# Patient Record
Sex: FEMALE | Race: WHITE | NOT HISPANIC OR LATINO | URBAN - METROPOLITAN AREA
[De-identification: names, ages, dates, MRNs, and addresses within clinical notes are randomized per-mention and may not be internally consistent; named-entity substitution may affect disease eponyms.]

---

## 2018-04-29 ENCOUNTER — EMERGENCY (EMERGENCY)
Facility: HOSPITAL | Age: 48
LOS: 1 days | Discharge: HOME | End: 2018-04-29
Attending: STUDENT IN AN ORGANIZED HEALTH CARE EDUCATION/TRAINING PROGRAM
Payer: COMMERCIAL

## 2018-04-29 VITALS — DIASTOLIC BLOOD PRESSURE: 69 MMHG | HEART RATE: 74 BPM | SYSTOLIC BLOOD PRESSURE: 123 MMHG

## 2018-04-29 VITALS — HEIGHT: 69 IN | WEIGHT: 184.97 LBS

## 2018-04-29 DIAGNOSIS — Z90.49 ACQUIRED ABSENCE OF OTHER SPECIFIED PARTS OF DIGESTIVE TRACT: Chronic | ICD-10-CM

## 2018-04-29 LAB
ALBUMIN SERPL ELPH-MCNC: 4.1 G/DL — SIGNIFICANT CHANGE UP (ref 3.3–5.2)
ALP SERPL-CCNC: 50 U/L — SIGNIFICANT CHANGE UP (ref 40–120)
ALT FLD-CCNC: 16 U/L — SIGNIFICANT CHANGE UP
ANION GAP SERPL CALC-SCNC: 13 MMOL/L — SIGNIFICANT CHANGE UP (ref 5–17)
AST SERPL-CCNC: 17 U/L — SIGNIFICANT CHANGE UP
BASOPHILS # BLD AUTO: 0 K/UL — SIGNIFICANT CHANGE UP (ref 0–0.2)
BASOPHILS NFR BLD AUTO: 0.5 % — SIGNIFICANT CHANGE UP (ref 0–2)
BILIRUB SERPL-MCNC: <0.2 MG/DL — LOW (ref 0.4–2)
BUN SERPL-MCNC: 13 MG/DL — SIGNIFICANT CHANGE UP (ref 8–20)
CALCIUM SERPL-MCNC: 9.3 MG/DL — SIGNIFICANT CHANGE UP (ref 8.6–10.2)
CHLORIDE SERPL-SCNC: 102 MMOL/L — SIGNIFICANT CHANGE UP (ref 98–107)
CK SERPL-CCNC: 38 U/L — SIGNIFICANT CHANGE UP (ref 25–170)
CO2 SERPL-SCNC: 23 MMOL/L — SIGNIFICANT CHANGE UP (ref 22–29)
CREAT SERPL-MCNC: 0.79 MG/DL — SIGNIFICANT CHANGE UP (ref 0.5–1.3)
D DIMER BLD IA.RAPID-MCNC: <150 NG/ML DDU — SIGNIFICANT CHANGE UP
EOSINOPHIL # BLD AUTO: 0.2 K/UL — SIGNIFICANT CHANGE UP (ref 0–0.5)
EOSINOPHIL NFR BLD AUTO: 1.6 % — SIGNIFICANT CHANGE UP (ref 0–6)
GLUCOSE SERPL-MCNC: 94 MG/DL — SIGNIFICANT CHANGE UP (ref 70–115)
HCT VFR BLD CALC: 44.2 % — SIGNIFICANT CHANGE UP (ref 37–47)
HGB BLD-MCNC: 15.1 G/DL — SIGNIFICANT CHANGE UP (ref 12–16)
LYMPHOCYTES # BLD AUTO: 3.2 K/UL — SIGNIFICANT CHANGE UP (ref 1–4.8)
LYMPHOCYTES # BLD AUTO: 31.9 % — SIGNIFICANT CHANGE UP (ref 20–55)
MCHC RBC-ENTMCNC: 31.6 PG — HIGH (ref 27–31)
MCHC RBC-ENTMCNC: 34.2 G/DL — SIGNIFICANT CHANGE UP (ref 32–36)
MCV RBC AUTO: 92.5 FL — SIGNIFICANT CHANGE UP (ref 81–99)
MONOCYTES # BLD AUTO: 0.6 K/UL — SIGNIFICANT CHANGE UP (ref 0–0.8)
MONOCYTES NFR BLD AUTO: 6.6 % — SIGNIFICANT CHANGE UP (ref 3–10)
NEUTROPHILS # BLD AUTO: 5.8 K/UL — SIGNIFICANT CHANGE UP (ref 1.8–8)
NEUTROPHILS NFR BLD AUTO: 59.2 % — SIGNIFICANT CHANGE UP (ref 37–73)
NT-PROBNP SERPL-SCNC: 290 PG/ML — SIGNIFICANT CHANGE UP (ref 0–300)
PLATELET # BLD AUTO: 244 K/UL — SIGNIFICANT CHANGE UP (ref 150–400)
POTASSIUM SERPL-MCNC: 3.9 MMOL/L — SIGNIFICANT CHANGE UP (ref 3.5–5.3)
POTASSIUM SERPL-SCNC: 3.9 MMOL/L — SIGNIFICANT CHANGE UP (ref 3.5–5.3)
PROT SERPL-MCNC: 6.9 G/DL — SIGNIFICANT CHANGE UP (ref 6.6–8.7)
RBC # BLD: 4.78 M/UL — SIGNIFICANT CHANGE UP (ref 4.4–5.2)
RBC # FLD: 12.3 % — SIGNIFICANT CHANGE UP (ref 11–15.6)
SODIUM SERPL-SCNC: 138 MMOL/L — SIGNIFICANT CHANGE UP (ref 135–145)
TROPONIN T SERPL-MCNC: <0.01 NG/ML — SIGNIFICANT CHANGE UP (ref 0–0.06)
WBC # BLD: 9.9 K/UL — SIGNIFICANT CHANGE UP (ref 4.8–10.8)
WBC # FLD AUTO: 9.9 K/UL — SIGNIFICANT CHANGE UP (ref 4.8–10.8)

## 2018-04-29 PROCEDURE — 85027 COMPLETE CBC AUTOMATED: CPT

## 2018-04-29 PROCEDURE — 83880 ASSAY OF NATRIURETIC PEPTIDE: CPT

## 2018-04-29 PROCEDURE — 99283 EMERGENCY DEPT VISIT LOW MDM: CPT | Mod: 25

## 2018-04-29 PROCEDURE — 84484 ASSAY OF TROPONIN QUANT: CPT

## 2018-04-29 PROCEDURE — 80053 COMPREHEN METABOLIC PANEL: CPT

## 2018-04-29 PROCEDURE — 71046 X-RAY EXAM CHEST 2 VIEWS: CPT

## 2018-04-29 PROCEDURE — 36415 COLL VENOUS BLD VENIPUNCTURE: CPT

## 2018-04-29 PROCEDURE — 93010 ELECTROCARDIOGRAM REPORT: CPT

## 2018-04-29 PROCEDURE — 99285 EMERGENCY DEPT VISIT HI MDM: CPT

## 2018-04-29 PROCEDURE — 82550 ASSAY OF CK (CPK): CPT

## 2018-04-29 PROCEDURE — 85379 FIBRIN DEGRADATION QUANT: CPT

## 2018-04-29 PROCEDURE — 71046 X-RAY EXAM CHEST 2 VIEWS: CPT | Mod: 26

## 2018-04-29 PROCEDURE — 93005 ELECTROCARDIOGRAM TRACING: CPT

## 2018-04-29 RX ORDER — ALBUTEROL 90 UG/1
2 AEROSOL, METERED ORAL
Qty: 1 | Refills: 0 | OUTPATIENT
Start: 2018-04-29 | End: 2018-05-28

## 2018-04-29 NOTE — ED STATDOCS - PROGRESS NOTE DETAILS
Patient c/o SOB on exertion and unable to lie flat.  Hx of tobacco abuse.  Will send patient to main ER to be evaluated by main ER physician.

## 2018-04-29 NOTE — ED PROVIDER NOTE - MUSCULOSKELETAL, MLM
Spine appears normal, range of motion is not limited, no muscle or joint tenderness. No calf tenderness. No peripheral edema.

## 2018-04-29 NOTE — ED PROVIDER NOTE - ENMT NEGATIVE STATEMENT, MLM
+NECK PAIN. Ears: no ear pain and no hearing problems.Nose: no nasal congestion and no nasal drainage.Mouth/Throat: no dysphagia, no hoarseness and no throat pain.Neck: no lumps, no pain, no stiffness and no swollen glands.

## 2018-04-29 NOTE — ED ADULT NURSE NOTE - OBJECTIVE STATEMENT
pt a+ox4, presents to ED c/o worsening SOB x1 week. pt reports intermittent SOB that is worse when lying down. pt denies fever, chills, night sweats. pt denies headache, lightheadedness or dizziness. pt denies chst pain or SOB. pt reports relief of SOB since coming to ED. pt reports 30 year hx of smoking 2 packs/day. bilat lung sounds clear, resp even and unlabored. pt in no apparent distress or discomfort, lying comfortably. will continue to moniotr.

## 2018-04-29 NOTE — ED PROVIDER NOTE - OBJECTIVE STATEMENT
46 y/o F pt with a pmhx of mitral valve prolapse, collapsed lung and colon CA presents to the ED c/o sob that onset a few days ago, exacerbating today. Describes a sensation that she has difficulty getting enough air or catching her breath. Localizes an intermittent/occasional neck pain and epigastric pain. States that the sensation is worse when she lies down and on exertion. Uses her inhaler with no relief. Admits that she has had a similar episode like this in the past  and it was diagnosed with a panic attack. Last echo was 1 year ago and stress test 1 year ago that were bother normal. Additionally notes a headache that onset while in the ED today. Smoker, 2 packs per day. Denies recent travel, peripheral edema, visual changes, chest pain, n/v/d/c, hormone therapy, recent trauma, heavy lifting, cough, sinus congestion, numbness. tingling, calf pain, bruising, urinary symptoms or any other complaints. NKDA.  PMD: Dr. Kowalski ([place])  Cardiologis: Dr. Landers 48 y/o F pt with a pmhx of mitral valve prolapse, collapsed lung and colon CA presents to the ED c/o sob that onset a few days ago, exacerbating today. Describes a sensation that she has difficulty getting enough air or catching her breath; described at times feeling the need to take a deep breath. Localizes an intermittent/occasional neck pain and epigastric pain. States that the sensation is worse when she lies down and sometimes with exertion. Uses her inhaler with no relief. Admits that she has had a similar episode like this in the past  and it was diagnosed with a panic attack. Last echo was 1 year ago and stress test 1 year ago that were bother normal. Additionally notes a headache that onset while in the ED today. Smoker, 2 packs per day. Denies recent travel, peripheral edema, visual changes, chest pain, n/v/d/c, hormone therapy, recent trauma, heavy lifting, cough, sinus congestion, numbness. tingling, calf pain, bruising, urinary symptoms or any other complaints. NKDA.  PMD: Dr. Kowalski ([place])  Cardiologis: Dr. Landers 48 y/o F pt with a pmhx of mitral valve prolapse, collapsed lung and colon CA presents to the ED c/o sob that onset a few days ago, exacerbating today. Describes a sensation that she has difficulty getting enough air or catching her breath; described at times feeling the need to take a deep breath. Localizes an intermittent/occasional neck pain and epigastric pain. States that the sensation is worse when she lies down and sometimes with exertion. Uses her inhaler with no relief. Admits that she has had a similar episode like this in the past  and it was diagnosed with a panic attack. Last echo was 1 year ago and stress test 1 year ago that were both normal. Additionally notes a headache that onset while in the ED today. Smoker, 2 packs per day. Denies recent travel, peripheral edema, visual changes, chest pain, n/v/d/c, hormone therapy, recent trauma, heavy lifting, cough, sinus congestion, numbness. tingling, calf pain, bruising, urinary symptoms or any other complaints. NKDA.  PMD: Dr. Kowalski ([employer])  Cardiologis: Dr. Landers

## 2018-04-30 RX ORDER — ALBUTEROL 90 UG/1
2 AEROSOL, METERED ORAL
Qty: 1 | Refills: 0 | OUTPATIENT
Start: 2018-04-30 | End: 2018-05-29

## 2020-03-26 PROBLEM — J93.9 PNEUMOTHORAX, UNSPECIFIED: Chronic | Status: ACTIVE | Noted: 2018-04-29

## 2020-03-26 PROBLEM — I34.1 NONRHEUMATIC MITRAL (VALVE) PROLAPSE: Chronic | Status: ACTIVE | Noted: 2018-04-29

## 2020-03-26 PROBLEM — C18.9 MALIGNANT NEOPLASM OF COLON, UNSPECIFIED: Chronic | Status: ACTIVE | Noted: 2018-04-29

## 2020-03-26 PROBLEM — J43.9 EMPHYSEMA, UNSPECIFIED: Chronic | Status: ACTIVE | Noted: 2018-04-29

## 2020-03-27 PROBLEM — Z00.00 ENCOUNTER FOR PREVENTIVE HEALTH EXAMINATION: Status: ACTIVE | Noted: 2020-03-27

## 2020-03-30 ENCOUNTER — APPOINTMENT (OUTPATIENT)
Dept: DISASTER EMERGENCY | Facility: CLINIC | Age: 50
End: 2020-03-30

## 2020-04-30 ENCOUNTER — MESSAGE (OUTPATIENT)
Age: 50
End: 2020-04-30

## 2020-05-06 ENCOUNTER — APPOINTMENT (OUTPATIENT)
Dept: DISASTER EMERGENCY | Facility: CLINIC | Age: 50
End: 2020-05-06

## 2020-05-07 LAB
SARS-COV-2 IGG SERPL IA-ACNC: <0.1 INDEX
SARS-COV-2 IGG SERPL QL IA: NEGATIVE

## 2020-06-12 ENCOUNTER — TRANSCRIPTION ENCOUNTER (OUTPATIENT)
Age: 50
End: 2020-06-12

## 2020-06-18 ENCOUNTER — APPOINTMENT (OUTPATIENT)
Dept: PHYSICAL MEDICINE AND REHAB | Facility: CLINIC | Age: 50
End: 2020-06-18
Payer: OTHER MISCELLANEOUS

## 2020-06-18 ENCOUNTER — FORM ENCOUNTER (OUTPATIENT)
Age: 50
End: 2020-06-18

## 2020-06-18 VITALS
DIASTOLIC BLOOD PRESSURE: 80 MMHG | HEIGHT: 69 IN | BODY MASS INDEX: 25.92 KG/M2 | TEMPERATURE: 97.8 F | SYSTOLIC BLOOD PRESSURE: 112 MMHG | WEIGHT: 175 LBS

## 2020-06-18 DIAGNOSIS — Z78.9 OTHER SPECIFIED HEALTH STATUS: ICD-10-CM

## 2020-06-18 DIAGNOSIS — J44.9 CHRONIC OBSTRUCTIVE PULMONARY DISEASE, UNSPECIFIED: ICD-10-CM

## 2020-06-18 DIAGNOSIS — Z85.038 PERSONAL HISTORY OF OTHER MALIGNANT NEOPLASM OF LARGE INTESTINE: ICD-10-CM

## 2020-06-18 DIAGNOSIS — Z80.9 FAMILY HISTORY OF MALIGNANT NEOPLASM, UNSPECIFIED: ICD-10-CM

## 2020-06-18 PROCEDURE — 99204 OFFICE O/P NEW MOD 45 MIN: CPT

## 2020-06-18 RX ORDER — TIZANIDINE HYDROCHLORIDE 2 MG/1
2 CAPSULE ORAL
Qty: 30 | Refills: 0 | Status: ACTIVE | COMMUNITY
Start: 2020-06-18 | End: 1900-01-01

## 2020-06-18 RX ORDER — NAPROXEN 500 MG/1
500 TABLET ORAL
Qty: 60 | Refills: 0 | Status: ACTIVE | COMMUNITY
Start: 2020-06-18 | End: 1900-01-01

## 2020-06-18 RX ORDER — PANTOPRAZOLE 40 MG/1
40 TABLET, DELAYED RELEASE ORAL
Qty: 30 | Refills: 0 | Status: ACTIVE | COMMUNITY
Start: 2020-06-18 | End: 1900-01-01

## 2020-06-19 PROBLEM — Z78.9 SOCIAL ALCOHOL USE: Status: ACTIVE | Noted: 2020-06-18

## 2020-06-19 PROBLEM — J44.9 ASTHMA WITH COPD: Status: RESOLVED | Noted: 2020-06-19 | Resolved: 2020-06-19

## 2020-06-19 PROBLEM — Z80.9 FAMILY HISTORY OF MALIGNANT NEOPLASM: Status: ACTIVE | Noted: 2020-06-18

## 2020-06-19 PROBLEM — Z85.038 HISTORY OF MALIGNANT NEOPLASM OF COLON: Status: RESOLVED | Noted: 2020-06-19 | Resolved: 2020-06-19

## 2020-06-19 PROBLEM — Z78.9 DOES NOT USE ILLICIT DRUGS: Status: ACTIVE | Noted: 2020-06-18

## 2020-06-19 NOTE — PHYSICAL EXAM
[Normal Station and Gait] : the gait and station were normal [Normal Motor Tone] : the muscle tone was normal [None] : no muscle rigidity was observed [Involuntary Movements] : no involuntary movements were seen [Normal] : Oriented to person, place, and time, insight and judgement were intact and the affect was normal [Physical Disability Temporary Total] : temporarily total disabled [Not Fit For Work] : Not fit for work [de-identified] : C spine: no gross deformity, TTP over the bilateral cervical paraspinals,FROM but pain with flexion, negative Spurling's, negative Mustafa's\par  [FreeTextEntry1] : out of work for 4-6 weeks, re-eval in 4 weeks
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
## 2020-06-19 NOTE — DATA REVIEWED
[Plain X-Rays] : plain X-Rays [FreeTextEntry1] : C spine:\par No compression fractures. Slight reversal the normal cervical lordosis. \par Minimal retrolisthesis at C5-C6. Alignment is otherwise normal. Multilevel \par spondylosis characterized by varying levels of disc height loss and marginal \par osteophyte formation, most pronounced at C4-C5 and C5-C6. \par

## 2020-06-25 ENCOUNTER — APPOINTMENT (OUTPATIENT)
Dept: ORTHOPEDIC SURGERY | Facility: CLINIC | Age: 50
End: 2020-06-25

## 2020-07-09 ENCOUNTER — APPOINTMENT (OUTPATIENT)
Dept: PHYSICAL MEDICINE AND REHAB | Facility: CLINIC | Age: 50
End: 2020-07-09
Payer: OTHER MISCELLANEOUS

## 2020-07-09 VITALS
BODY MASS INDEX: 25.92 KG/M2 | HEIGHT: 69 IN | TEMPERATURE: 98.2 F | WEIGHT: 175 LBS | SYSTOLIC BLOOD PRESSURE: 132 MMHG | DIASTOLIC BLOOD PRESSURE: 88 MMHG

## 2020-07-09 PROCEDURE — 99213 OFFICE O/P EST LOW 20 MIN: CPT
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
## 2020-07-11 NOTE — PHYSICAL EXAM
[Normal Motor Tone] : the muscle tone was normal [Normal Station and Gait] : the gait and station were normal [Normal] : motor strength was normal in all muscle groups [Involuntary Movements] : no involuntary movements were seen [None] : no muscle rigidity was observed [Not Fit For Work] : Not fit for work [Physical Disability Temporary Total] : temporarily total disabled [FreeTextEntry1] : out of work for 4-6 weeks, re-eval in 4 weeks

## 2020-07-11 NOTE — DATA REVIEWED
[Plain X-Rays] : plain X-Rays [FreeTextEntry1] : C spine:\par No compression fractures. Slight reversal the normal cervical lordosis. \par Minimal retrolisthesis at C5-C6. Alignment is otherwise normal. Multilevel \par spondylosis characterized by varying levels of disc height loss and marginal \par osteophyte formation, most pronounced at C4-C5 and C5-C6. \par \par \par

## 2020-07-11 NOTE — ASSESSMENT
[FreeTextEntry1] : No improvement in pain despite over 6 weeks of physician guided conservative management including PT and oral NSAIDs, will need MRI now to guide next step in management.\par \par Will also get EMG/NCS to further evaluate \par \par Continue PT in the interim

## 2020-07-16 ENCOUNTER — FORM ENCOUNTER (OUTPATIENT)
Age: 50
End: 2020-07-16

## 2020-07-18 ENCOUNTER — APPOINTMENT (OUTPATIENT)
Dept: MRI IMAGING | Facility: CLINIC | Age: 50
End: 2020-07-18
Payer: OTHER MISCELLANEOUS

## 2020-07-18 ENCOUNTER — RESULT REVIEW (OUTPATIENT)
Age: 50
End: 2020-07-18

## 2020-07-18 ENCOUNTER — OUTPATIENT (OUTPATIENT)
Dept: OUTPATIENT SERVICES | Facility: HOSPITAL | Age: 50
LOS: 1 days | End: 2020-07-18
Payer: COMMERCIAL

## 2020-07-18 DIAGNOSIS — Z90.49 ACQUIRED ABSENCE OF OTHER SPECIFIED PARTS OF DIGESTIVE TRACT: Chronic | ICD-10-CM

## 2020-07-18 DIAGNOSIS — M54.12 RADICULOPATHY, CERVICAL REGION: ICD-10-CM

## 2020-07-18 PROCEDURE — 72141 MRI NECK SPINE W/O DYE: CPT | Mod: 26

## 2020-07-18 PROCEDURE — 72141 MRI NECK SPINE W/O DYE: CPT

## 2020-07-23 ENCOUNTER — APPOINTMENT (OUTPATIENT)
Dept: PHYSICAL MEDICINE AND REHAB | Facility: CLINIC | Age: 50
End: 2020-07-23
Payer: OTHER MISCELLANEOUS

## 2020-07-23 VITALS
WEIGHT: 175 LBS | SYSTOLIC BLOOD PRESSURE: 127 MMHG | HEIGHT: 69 IN | BODY MASS INDEX: 25.92 KG/M2 | DIASTOLIC BLOOD PRESSURE: 90 MMHG

## 2020-07-23 PROCEDURE — 99214 OFFICE O/P EST MOD 30 MIN: CPT

## 2020-07-23 NOTE — ASSESSMENT
[FreeTextEntry1] : Offered CLARA but would like to hold off for now. Cont PT if no further improvement consider right C7/T1 ILESI. Recommended patient see ENT about pharyngeal prominence seen on MRI.

## 2020-07-23 NOTE — PHYSICAL EXAM
[Normal Station and Gait] : the gait and station were normal [Normal Motor Tone] : the muscle tone was normal [None] : no muscle rigidity was observed [Involuntary Movements] : no involuntary movements were seen [Normal] : Oriented to person, place, and time, insight and judgement were intact and the affect was normal [FreeTextEntry1] : \par \par  [de-identified] : C spine: no gross deformity, TTP over the bilateral cervical paraspinals,FROM but pain with flexion, negative Spurling's, negative Mustafa's\par

## 2020-07-23 NOTE — HISTORY OF PRESENT ILLNESS
[FreeTextEntry1] : Patient presents for follow up and reports no change in pain. Here to review MRI results and discuss.

## 2020-08-03 ENCOUNTER — APPOINTMENT (OUTPATIENT)
Dept: PHYSICAL MEDICINE AND REHAB | Facility: CLINIC | Age: 50
End: 2020-08-03
Payer: OTHER MISCELLANEOUS

## 2020-08-03 VITALS
SYSTOLIC BLOOD PRESSURE: 139 MMHG | HEIGHT: 69 IN | BODY MASS INDEX: 25.92 KG/M2 | DIASTOLIC BLOOD PRESSURE: 89 MMHG | TEMPERATURE: 97.2 F | WEIGHT: 175 LBS | HEART RATE: 83 BPM

## 2020-08-03 PROCEDURE — 95886 MUSC TEST DONE W/N TEST COMP: CPT | Mod: RT

## 2020-08-03 PROCEDURE — 95908 NRV CNDJ TST 3-4 STUDIES: CPT | Mod: RT

## 2020-08-13 ENCOUNTER — APPOINTMENT (OUTPATIENT)
Dept: PHYSICAL MEDICINE AND REHAB | Facility: CLINIC | Age: 50
End: 2020-08-13
Payer: OTHER MISCELLANEOUS

## 2020-08-13 VITALS
HEIGHT: 69 IN | SYSTOLIC BLOOD PRESSURE: 125 MMHG | TEMPERATURE: 98.4 F | WEIGHT: 175 LBS | DIASTOLIC BLOOD PRESSURE: 78 MMHG | BODY MASS INDEX: 25.92 KG/M2

## 2020-08-13 PROCEDURE — 99213 OFFICE O/P EST LOW 20 MIN: CPT

## 2020-08-13 NOTE — PHYSICAL EXAM
[Normal Station and Gait] : the gait and station were normal [Normal Motor Tone] : the muscle tone was normal [None] : no muscle rigidity was observed [Involuntary Movements] : no involuntary movements were seen [Normal] : Oriented to person, place, and time, insight and judgement were intact and the affect was normal [de-identified] : C spine: no gross deformity, TTP over the bilateral cervical paraspinals,FROM but pain with flexion, negative Spurling's, negative Mustafa's\par  [Not Fit For Work] : Not fit for work [Physical Disability Temporary Total] : temporarily total disabled [FreeTextEntry1] : out of work for 2 weeks, re-eval in 2 weeks

## 2020-08-13 NOTE — HISTORY OF PRESENT ILLNESS
[FreeTextEntry1] : Since last visit, underwent EMG - no evidence of cervical radiculopathy; has potential ulnar neuropathy\par Ongoing PT - reports 80% improvement.  Does have intermittent temporary pain.

## 2020-08-13 NOTE — ASSESSMENT
[FreeTextEntry1] : PT with 80% improvement - have not completed course.  Unable to return to work because there's no light duty.  ENT scehduled for 8/17 - about pharyngeal prominence seen on MRI.

## 2020-08-27 ENCOUNTER — APPOINTMENT (OUTPATIENT)
Dept: PHYSICAL MEDICINE AND REHAB | Facility: CLINIC | Age: 50
End: 2020-08-27
Payer: OTHER MISCELLANEOUS

## 2020-08-27 VITALS
DIASTOLIC BLOOD PRESSURE: 82 MMHG | SYSTOLIC BLOOD PRESSURE: 119 MMHG | TEMPERATURE: 97.9 F | WEIGHT: 175 LBS | HEIGHT: 69 IN | BODY MASS INDEX: 25.92 KG/M2

## 2020-08-27 DIAGNOSIS — M54.12 RADICULOPATHY, CERVICAL REGION: ICD-10-CM

## 2020-08-27 PROCEDURE — 99213 OFFICE O/P EST LOW 20 MIN: CPT

## 2020-08-30 NOTE — PHYSICAL EXAM
[Normal Station and Gait] : the gait and station were normal [Normal Motor Tone] : the muscle tone was normal [None] : no muscle rigidity was observed [Involuntary Movements] : no involuntary movements were seen [Normal] : Oriented to person, place, and time, insight and judgement were intact and the affect was normal [FreeTextEntry1] : \par \par  [de-identified] : C spine: no gross deformity, no TTP over the bilateral cervical paraspinals,FROM, negative Spurling's, negative Mustafa's\par

## 2020-08-30 NOTE — ASSESSMENT
[FreeTextEntry1] : Improved, continue HEP and follow up in 4 weeks. Cleared to return to work on 08/28/2020 without restrictons.

## 2020-08-30 NOTE — HISTORY OF PRESENT ILLNESS
[FreeTextEntry1] : Patient presents for follow up and reports pain is improved. Ready to resume work.

## 2020-12-23 ENCOUNTER — TRANSCRIPTION ENCOUNTER (OUTPATIENT)
Age: 50
End: 2020-12-23

## 2024-08-06 ENCOUNTER — NON-APPOINTMENT (OUTPATIENT)
Age: 54
End: 2024-08-06

## 2024-08-13 ENCOUNTER — APPOINTMENT (OUTPATIENT)
Dept: THORACIC SURGERY | Facility: CLINIC | Age: 54
End: 2024-08-13
Payer: COMMERCIAL

## 2024-08-13 DIAGNOSIS — R59.0 LOCALIZED ENLARGED LYMPH NODES: ICD-10-CM

## 2024-08-13 DIAGNOSIS — R91.1 SOLITARY PULMONARY NODULE: ICD-10-CM

## 2024-08-13 PROCEDURE — 99204 OFFICE O/P NEW MOD 45 MIN: CPT

## 2024-08-14 ENCOUNTER — NON-APPOINTMENT (OUTPATIENT)
Age: 54
End: 2024-08-14

## 2024-08-15 ENCOUNTER — NON-APPOINTMENT (OUTPATIENT)
Age: 54
End: 2024-08-15

## 2024-08-15 PROBLEM — R59.0 MEDIASTINAL LYMPHADENOPATHY: Status: ACTIVE | Noted: 2024-08-09

## 2024-08-15 PROBLEM — R91.1 NODULE OF RIGHT LUNG: Status: ACTIVE | Noted: 2024-08-09

## 2024-08-15 NOTE — DATA REVIEWED
[FreeTextEntry1] : Independently reviewed the following: - CT Chest on 8/2/2024: ([facility]) - PET/CT on 8/8/24

## 2024-08-15 NOTE — ASSESSMENT
[FreeTextEntry1] : Ms. MANISH CHATTERJEE, 53 year old female, Current smoker (2 PPD x 35 years) , w/ hx of colon cancer, Emphysema, s/p Colectomy (2021), Left Pneumothorax (1998) family hx of lung cancer. Referred by Cancer care direct.   CT Chest on 8/2/2024: ([facility]) - Unchanged moderate-severe upper lobe predominant emphysema with bullous changes of both apices. - Increased solid and cystic nodule in the posterior RUL measuring 2.1 cm x 1.5 cm (series 6, image 98), previously 1.2 x 0.7 cm.  - Unchanged adjacent 0.7 cm (series 6, image 90) nodule.  - 0.6 cm anterior RUL nodule noted on prior study has essentially resolved with small residual linear opacity (series 6, image 89). Slightly decreased density of 0.7 cm RUL nodule  (series 6, image 182).  - New 0.6 cm RUL nodule on the same image.  - Newly enlarged right lower paratracheal lymph node measuring  3.7 x 2.2 x 7.6 cm (series 4, image 68). Other subcentimeter right paratracheal nodes have minimally increased. No obvious hilar lymph node enlargement on noncontrast CT.  Nondilated esophagus.   PET/CT on 8/8/24: ([facility]) - Confluent hypermetabolic adenopathy within the mediastinum involving the pretracheal, paratracheal, and precarinal regions.  - In the precarinal region this measures 3.9 x 1.7 cm with maximum SUV of 14.9.   - Hypermetabolic right hilar lymph node. - Fibrotic changes within the posterior right lung with hypermetabolic mixed cystic and solid nodule in this location measuring proximally 1.4 x 1.2 cm with maximum SUV of 3.9.  - Emphysematous changes bilaterally. Nodule within the anterior left upper lobe is too small to characterize on PET. - Mild hypermetabolic activity within the cervix. Correlation with gynecologic exam is suggested. - Partial visualization of hypermetabolic lesion within the right occipital lobe of the brain correlating to lesion recently noted on MRI.   I have independently reviewed the medical records and imaging at the time of this office consultation, and discussed the following interpretations with the patient: - Imaging reviewed. Discussed flex bronch, EBUS for diagnosis, which will be needed to dictate further treatment. Risks, benefits and alternatives explained to patient; All questions answered.  - Cardiac clearance required prior to procedure. Will also obtain NODIFY and IQ testing - Patient reports she had underwent a brain MRI at [facility] on 8/1/24 w/ abnormal findings. She is following up with a neurologist at [facility].  - smoking cessation discussed. Patient will consider.   Recommendations reviewed with patient during this office visit, and all questions answered; Patient instructed on the importance of follow up and verbalizes understanding.  I, Dr. MONCADA [facility], personally performed the evaluation and management (E/M) services for this established patient. That E/M includes conducting the examination, assessing all new/exacerbated conditions, and establishing a new plan of care. Today, My ACP, Bell Mao, was here to observe my evaluation and management services for this patient to be followed going forward.

## 2024-08-15 NOTE — PHYSICAL EXAM
[Fully active, able to carry on all pre-disease performance without restriction] : Status 0 - Fully active, able to carry on all pre-disease performance without restriction [General Appearance - Alert] : alert [General Appearance - In No Acute Distress] : in no acute distress [General Appearance - Well Nourished] : well nourished [Sclera] : the sclera and conjunctiva were normal [Extraocular Movements] : extraocular movements were intact [Outer Ear] : the ears and nose were normal in appearance [Hearing Threshold Finger Rub Not Weber] : hearing was normal [Neck Appearance] : the appearance of the neck was normal [Neck Cervical Mass (___cm)] : no neck mass was observed [Respiration, Rhythm And Depth] : normal respiratory rhythm and effort [] : no respiratory distress [Exaggerated Use Of Accessory Muscles For Inspiration] : no accessory muscle use [Auscultation Breath Sounds / Voice Sounds] : lungs were clear to auscultation bilaterally [Heart Rate And Rhythm] : heart rate was normal and rhythm regular [Examination Of The Chest] : the chest was normal in appearance [Diminished Respiratory Excursion] : normal chest expansion [Chest Visual Inspection Thoracic Asymmetry] : no chest asymmetry [2+] : left 2+ [Breast Appearance] : normal in appearance [Breast Palpation Mass] : no palpable masses [Bowel Sounds] : normal bowel sounds [Abdomen Soft] : soft [Abdomen Tenderness] : non-tender [Cervical Lymph Nodes Enlarged Posterior Bilaterally] : posterior cervical [Cervical Lymph Nodes Enlarged Anterior Bilaterally] : anterior cervical [Supraclavicular Lymph Nodes Enlarged Bilaterally] : supraclavicular [No CVA Tenderness] : no ~M costovertebral angle tenderness [No Spinal Tenderness] : no spinal tenderness [Involuntary Movements] : no involuntary movements were seen [Skin Color & Pigmentation] : normal skin color and pigmentation [No Focal Deficits] : no focal deficits [Oriented To Time, Place, And Person] : oriented to person, place, and time [FreeTextEntry1] : Deferred

## 2024-08-15 NOTE — HISTORY OF PRESENT ILLNESS
[FreeTextEntry1] : Ms. MANISH CHATTERJEE, 53 year old female, Current smoker (2 PPD x 35 years) , w/ hx of colon cancer, Emphysema, s/p Colectomy (2021), Left Pneumothorax (1998) family hx of lung cancer. Referred by Cancer care direct.   CT Chest on 8/2/2024: ([facility]) - Unchanged moderate-severe upper lobe predominant emphysema with bullous changes of both apices. - Increased solid and cystic nodule in the posterior RUL measuring 2.1 cm x 1.5 cm (series 6, image 98), previously 1.2 x 0.7 cm.  - Unchanged adjacent 0.7 cm (series 6, image 90) nodule.  - 0.6 cm anterior RUL nodule noted on prior study has essentially resolved with small residual linear opacity (series 6, image 89). Slightly decreased density of 0.7 cm RUL nodule  (series 6, image 182).  - New 0.6 cm RUL nodule on the same image.  - Newly enlarged right lower paratracheal lymph node measuring  3.7 x 2.2 x 7.6 cm (series 4, image 68). Other subcentimeter right paratracheal nodes have minimally increased. No obvious hilar lymph node enlargement on noncontrast CT.  Nondilated esophagus.   PET/CT on 8/8/24: ([facility]) - Confluent hypermetabolic adenopathy within the mediastinum involving the pretracheal, paratracheal, and precarinal regions.  - In the precarinal region this measures 3.9 x 1.7 cm with maximum SUV of 14.9.   - Hypermetabolic right hilar lymph node. - Fibrotic changes within the posterior right lung with hypermetabolic mixed cystic and solid nodule in this location measuring proximally 1.4 x 1.2 cm with maximum SUV of 3.9.  - Emphysematous changes bilaterally. Nodule within the anterior left upper lobe is too small to characterize on PET. - Mild hypermetabolic activity within the cervix. Correlation with gynecologic exam is suggested. - Partial visualization of hypermetabolic lesion within the right occipital lobe of the brain correlating to lesion recently noted on MRI.   OF NOTE: PFTs are from 2021  Patient presents today for CTSX consultation. Today, patient denies worsening SOB, chest pain, hemoptysis, fever, chills, night sweats, lightheadedness or dizziness.

## 2024-08-15 NOTE — ASSESSMENT
[FreeTextEntry1] : Ms. MANISH CHATTERJEE, 53 year old female, Current smoker (2 PPD x 35 years) , w/ hx of colon cancer, Emphysema, s/p Colectomy (2021), Left Pneumothorax (1998) family hx of lung cancer. Referred by Cancer care direct.   CT Chest on 8/2/2024: ([facility]) - Unchanged moderate-severe upper lobe predominant emphysema with bullous changes of both apices. - Increased solid and cystic nodule in the posterior RUL measuring 2.1 cm x 1.5 cm (series 6, image 98), previously 1.2 x 0.7 cm.  - Unchanged adjacent 0.7 cm (series 6, image 90) nodule.  - 0.6 cm anterior RUL nodule noted on prior study has essentially resolved with small residual linear opacity (series 6, image 89). Slightly decreased density of 0.7 cm RUL nodule  (series 6, image 182).  - New 0.6 cm RUL nodule on the same image.  - Newly enlarged right lower paratracheal lymph node measuring  3.7 x 2.2 x 7.6 cm (series 4, image 68). Other subcentimeter right paratracheal nodes have minimally increased. No obvious hilar lymph node enlargement on noncontrast CT.  Nondilated esophagus.   PET/CT on 8/8/24: ([facility]) - Confluent hypermetabolic adenopathy within the mediastinum involving the pretracheal, paratracheal, and precarinal regions.  - In the precarinal region this measures 3.9 x 1.7 cm with maximum SUV of 14.9.   - Hypermetabolic right hilar lymph node. - Fibrotic changes within the posterior right lung with hypermetabolic mixed cystic and solid nodule in this location measuring proximally 1.4 x 1.2 cm with maximum SUV of 3.9.  - Emphysematous changes bilaterally. Nodule within the anterior left upper lobe is too small to characterize on PET. - Mild hypermetabolic activity within the cervix. Correlation with gynecologic exam is suggested. - Partial visualization of hypermetabolic lesion within the right occipital lobe of the brain correlating to lesion recently noted on MRI.   I have independently reviewed the medical records and imaging at the time of this office consultation, and discussed the following interpretations with the patient: - Imaging reviewed. Discussed flex bronch, EBUS for diagnosis, which will be needed to dictate further treatment. Risks, benefits and alternatives explained to patient; All questions answered.  - Cardiac clearance required prior to procedure. Will also obtain NODIFY and IQ testing - Patient reports she had underwent a brain MRI at [facility] on 8/1/24 w/ abnormal findings. She is following up with a neurologist at [facility].  - smoking cessation discussed. Patient will consider.   Recommendations reviewed with patient during this office visit, and all questions answered; Patient instructed on the importance of follow up and verbalizes understanding.  I, Dr. MONCADA [facility], personally performed the evaluation and management (E/M) services for this established patient. That E/M includes conducting the examination, assessing all new/exacerbated conditions, and establishing a new plan of care. Today, My ACP, Bell Mao, was here to observe my evaluation and management services for this patient to be followed going forward.

## 2024-08-15 NOTE — PHYSICAL EXAM
[Fully active, able to carry on all pre-disease performance without restriction] : Status 0 - Fully active, able to carry on all pre-disease performance without restriction [General Appearance - Alert] : alert [General Appearance - In No Acute Distress] : in no acute distress [Sclera] : the sclera and conjunctiva were normal [General Appearance - Well Nourished] : well nourished [Extraocular Movements] : extraocular movements were intact [Outer Ear] : the ears and nose were normal in appearance [Hearing Threshold Finger Rub Not McPherson] : hearing was normal [Neck Appearance] : the appearance of the neck was normal [Neck Cervical Mass (___cm)] : no neck mass was observed [Respiration, Rhythm And Depth] : normal respiratory rhythm and effort [] : no respiratory distress [Exaggerated Use Of Accessory Muscles For Inspiration] : no accessory muscle use [Auscultation Breath Sounds / Voice Sounds] : lungs were clear to auscultation bilaterally [Heart Rate And Rhythm] : heart rate was normal and rhythm regular [Examination Of The Chest] : the chest was normal in appearance [Diminished Respiratory Excursion] : normal chest expansion [Chest Visual Inspection Thoracic Asymmetry] : no chest asymmetry [2+] : left 2+ [Breast Appearance] : normal in appearance [Breast Palpation Mass] : no palpable masses [Bowel Sounds] : normal bowel sounds [Abdomen Soft] : soft [Abdomen Tenderness] : non-tender [Cervical Lymph Nodes Enlarged Posterior Bilaterally] : posterior cervical [Cervical Lymph Nodes Enlarged Anterior Bilaterally] : anterior cervical [Supraclavicular Lymph Nodes Enlarged Bilaterally] : supraclavicular [No CVA Tenderness] : no ~M costovertebral angle tenderness [No Spinal Tenderness] : no spinal tenderness [Involuntary Movements] : no involuntary movements were seen [Skin Color & Pigmentation] : normal skin color and pigmentation [No Focal Deficits] : no focal deficits [Oriented To Time, Place, And Person] : oriented to person, place, and time [FreeTextEntry1] : Deferred

## 2024-08-15 NOTE — HISTORY OF PRESENT ILLNESS
Physical Therapy Note:    Attempted to see patient this PM for physical therapy treatment  session. Patient off the floor in OR. Will follow and re-attempt as schedule permits/patient available.  Thank you,    A Zia Ruiz PT     Rehab Caseload Tracker [FreeTextEntry1] : Ms. MANISH CHATTERJEE, 53 year old female, Current smoker (2 PPD x 35 years) , w/ hx of colon cancer, Emphysema, s/p Colectomy (2021), Left Pneumothorax (1998) family hx of lung cancer. Referred by Cancer care direct.   CT Chest on 8/2/2024: ([facility]) - Unchanged moderate-severe upper lobe predominant emphysema with bullous changes of both apices. - Increased solid and cystic nodule in the posterior RUL measuring 2.1 cm x 1.5 cm (series 6, image 98), previously 1.2 x 0.7 cm.  - Unchanged adjacent 0.7 cm (series 6, image 90) nodule.  - 0.6 cm anterior RUL nodule noted on prior study has essentially resolved with small residual linear opacity (series 6, image 89). Slightly decreased density of 0.7 cm RUL nodule  (series 6, image 182).  - New 0.6 cm RUL nodule on the same image.  - Newly enlarged right lower paratracheal lymph node measuring  3.7 x 2.2 x 7.6 cm (series 4, image 68). Other subcentimeter right paratracheal nodes have minimally increased. No obvious hilar lymph node enlargement on noncontrast CT.  Nondilated esophagus.   PET/CT on 8/8/24: ([facility]) - Confluent hypermetabolic adenopathy within the mediastinum involving the pretracheal, paratracheal, and precarinal regions.  - In the precarinal region this measures 3.9 x 1.7 cm with maximum SUV of 14.9.   - Hypermetabolic right hilar lymph node. - Fibrotic changes within the posterior right lung with hypermetabolic mixed cystic and solid nodule in this location measuring proximally 1.4 x 1.2 cm with maximum SUV of 3.9.  - Emphysematous changes bilaterally. Nodule within the anterior left upper lobe is too small to characterize on PET. - Mild hypermetabolic activity within the cervix. Correlation with gynecologic exam is suggested. - Partial visualization of hypermetabolic lesion within the right occipital lobe of the brain correlating to lesion recently noted on MRI.   OF NOTE: PFTs are from 2021  Patient presents today for CTSX consultation. Today, patient denies worsening SOB, chest pain, hemoptysis, fever, chills, night sweats, lightheadedness or dizziness.

## 2024-08-26 ENCOUNTER — APPOINTMENT (OUTPATIENT)
Dept: THORACIC SURGERY | Facility: HOSPITAL | Age: 54
End: 2024-08-26

## 2024-08-29 ENCOUNTER — NON-APPOINTMENT (OUTPATIENT)
Age: 54
End: 2024-08-29

## 2024-09-10 ENCOUNTER — TRANSCRIPTION ENCOUNTER (OUTPATIENT)
Age: 54
End: 2024-09-10

## 2024-09-10 ENCOUNTER — APPOINTMENT (OUTPATIENT)
Dept: THORACIC SURGERY | Facility: CLINIC | Age: 54
End: 2024-09-10

## 2024-09-10 NOTE — HISTORY OF PRESENT ILLNESS
[FreeTextEntry1] : Ms. MANISH CHATTERJEE, 53 year old female, Current smoker (2 PPD x 35 years) , w/ hx of colon cancer, Emphysema, s/p Colectomy (2021), Left Pneumothorax (1998) family hx of lung cancer. Referred by Cancer care direct.   CT Chest on 8/2/2024: ([facility]) - Unchanged moderate-severe upper lobe predominant emphysema with bullous changes of both apices. - Increased solid and cystic nodule in the posterior RUL measuring 2.1 cm x 1.5 cm (series 6, image 98), previously 1.2 x 0.7 cm.  - Unchanged adjacent 0.7 cm (series 6, image 90) nodule.  - 0.6 cm anterior RUL nodule noted on prior study has essentially resolved with small residual linear opacity (series 6, image 89). Slightly decreased density of 0.7 cm RUL nodule  (series 6, image 182).  - New 0.6 cm RUL nodule on the same image.  - Newly enlarged right lower paratracheal lymph node measuring  3.7 x 2.2 x 7.6 cm (series 4, image 68). Other subcentimeter right paratracheal nodes have minimally increased. No obvious hilar lymph node enlargement on noncontrast CT.  Nondilated esophagus.   PET/CT on 8/8/24: ([facility]) - Confluent hypermetabolic adenopathy within the mediastinum involving the pretracheal, paratracheal, and precarinal regions.  - In the precarinal region this measures 3.9 x 1.7 cm with maximum SUV of 14.9.   - Hypermetabolic right hilar lymph node. - Fibrotic changes within the posterior right lung with hypermetabolic mixed cystic and solid nodule in this location measuring proximally 1.4 x 1.2 cm with maximum SUV of 3.9.  - Emphysematous changes bilaterally. Nodule within the anterior left upper lobe is too small to characterize on PET. - Mild hypermetabolic activity within the cervix. Correlation with gynecologic exam is suggested. - Partial visualization of hypermetabolic lesion within the right occipital lobe of the brain correlating to lesion recently noted on MRI.   OF NOTE: PFTs are from 2021  Initial consultation on 8/13/24: Imaging reviewed. Discussed flex bronch, EBUS for diagnosis, which will be needed to dictate further treatment. Risks, benefits and alternatives explained to patient; All questions answered.  - Cardiac clearance required prior to procedure. Will also obtain NODIFY and IQ testing - Patient reports she had underwent a brain MRI at [facility] on 8/1/24 w/ abnormal findings. She is following up with a neurologist at [facility].  - smoking cessation discussed. Patient will consider.   8/16/24: EBUS TBNA of the 4R paratracheal LN showed positive for malignancy, metastatic carcinoma. The immunoprofile confirms epithelial origin. Primary site of origin remains unclear as it is not highlighted by any of the immunoperoxidase stains for pulmonary adenocarcinoma, SCC, breast or Gyn primaries.   Now, s/p craniotomy, resection stereotactic resection of right occipital lobe (Dr. Willis) on 8/26/24. Path demonstrating metastatic adenocarcinoma favor colorectal primary origin if all other sites have been ruled out.   OF NOTE: Pulm (Dr. Karsten Claudio) recommended pulm rehab  Followed up with Dr. Nina?, NeuroSx: Discussed the option of postoperative stereotactic radiosurgery for brain metastases. Likely 3 fractions. Recommended to follow up with Dr. Lujan (Oncology?) for systemic options.   Patient presents today follow up.

## 2024-09-10 NOTE — ASSESSMENT
[FreeTextEntry1] : Ms. MANISH CHTATERJEE, 53 year old female, Current smoker (2 PPD x 35 years) , w/ hx of colon cancer, Emphysema, s/p Colectomy (2021), Left Pneumothorax (1998) family hx of lung cancer. Referred by Cancer care direct.   CT Chest on 8/2/2024: ([facility]) - Unchanged moderate-severe upper lobe predominant emphysema with bullous changes of both apices. - Increased solid and cystic nodule in the posterior RUL measuring 2.1 cm x 1.5 cm (series 6, image 98), previously 1.2 x 0.7 cm.  - Unchanged adjacent 0.7 cm (series 6, image 90) nodule.  - 0.6 cm anterior RUL nodule noted on prior study has essentially resolved with small residual linear opacity (series 6, image 89). Slightly decreased density of 0.7 cm RUL nodule  (series 6, image 182).  - New 0.6 cm RUL nodule on the same image.  - Newly enlarged right lower paratracheal lymph node measuring  3.7 x 2.2 x 7.6 cm (series 4, image 68). Other subcentimeter right paratracheal nodes have minimally increased. No obvious hilar lymph node enlargement on noncontrast CT.  Nondilated esophagus.   PET/CT on 8/8/24: ([facility]) - Confluent hypermetabolic adenopathy within the mediastinum involving the pretracheal, paratracheal, and precarinal regions.  - In the precarinal region this measures 3.9 x 1.7 cm with maximum SUV of 14.9.   - Hypermetabolic right hilar lymph node. - Fibrotic changes within the posterior right lung with hypermetabolic mixed cystic and solid nodule in this location measuring proximally 1.4 x 1.2 cm with maximum SUV of 3.9.  - Emphysematous changes bilaterally. Nodule within the anterior left upper lobe is too small to characterize on PET. - Mild hypermetabolic activity within the cervix. Correlation with gynecologic exam is suggested. - Partial visualization of hypermetabolic lesion within the right occipital lobe of the brain correlating to lesion recently noted on MRI.   I have independently reviewed the medical records and imaging at the time of this office consultation, and discussed the following interpretations with the patient: -

## 2024-09-10 NOTE — HISTORY OF PRESENT ILLNESS
[FreeTextEntry1] : Ms. MANISH CHATTERJEE, 53 year old female, Current smoker (2 PPD x 35 years) , w/ hx of colon cancer, Emphysema, s/p Colectomy (2021), Left Pneumothorax (1998) family hx of lung cancer. Referred by Cancer care direct.   CT Chest on 8/2/2024: ([facility]) - Unchanged moderate-severe upper lobe predominant emphysema with bullous changes of both apices. - Increased solid and cystic nodule in the posterior RUL measuring 2.1 cm x 1.5 cm (series 6, image 98), previously 1.2 x 0.7 cm.  - Unchanged adjacent 0.7 cm (series 6, image 90) nodule.  - 0.6 cm anterior RUL nodule noted on prior study has essentially resolved with small residual linear opacity (series 6, image 89). Slightly decreased density of 0.7 cm RUL nodule  (series 6, image 182).  - New 0.6 cm RUL nodule on the same image.  - Newly enlarged right lower paratracheal lymph node measuring  3.7 x 2.2 x 7.6 cm (series 4, image 68). Other subcentimeter right paratracheal nodes have minimally increased. No obvious hilar lymph node enlargement on noncontrast CT.  Nondilated esophagus.   PET/CT on 8/8/24: ([facility]) - Confluent hypermetabolic adenopathy within the mediastinum involving the pretracheal, paratracheal, and precarinal regions.  - In the precarinal region this measures 3.9 x 1.7 cm with maximum SUV of 14.9.   - Hypermetabolic right hilar lymph node. - Fibrotic changes within the posterior right lung with hypermetabolic mixed cystic and solid nodule in this location measuring proximally 1.4 x 1.2 cm with maximum SUV of 3.9.  - Emphysematous changes bilaterally. Nodule within the anterior left upper lobe is too small to characterize on PET. - Mild hypermetabolic activity within the cervix. Correlation with gynecologic exam is suggested. - Partial visualization of hypermetabolic lesion within the right occipital lobe of the brain correlating to lesion recently noted on MRI.   OF NOTE: PFTs are from 2021  Initial consultation on 8/13/24: Imaging reviewed. Discussed flex bronch, EBUS for diagnosis, which will be needed to dictate further treatment. Risks, benefits and alternatives explained to patient; All questions answered.  - Cardiac clearance required prior to procedure. Will also obtain NODIFY and IQ testing - Patient reports she had underwent a brain MRI at [facility] on 8/1/24 w/ abnormal findings. She is following up with a neurologist at [facility].  - smoking cessation discussed. Patient will consider.   8/16/24: EBUS TBNA of the 4R paratracheal LN showed positive for malignancy, metastatic carcinoma. The immunoprofile confirms epithelial origin. Primary site of origin remains unclear as it is not highlighted by any of the immunoperoxidase stains for pulmonary adenocarcinoma, SCC, breast or Gyn primaries.   Now, s/p craniotomy, resection stereotactic resection of right occipital lobe (Dr. Willis) on 8/26/24. Path demonstrating metastatic adenocarcinoma favor colorectal primary origin if all other sites have been ruled out.   OF NOTE: Pulm (Dr. Karsten Claudio) recommended pulm rehab  Followed up with Dr. Nina?, NeuroSx: Discussed the option of postoperative stereotactic radiosurgery for brain metastases. Likely 3 fractions. Recommended to follow up with Dr. Lujan (Oncology?) for systemic options.   Patient presents today follow up.

## 2024-09-10 NOTE — ASSESSMENT
[FreeTextEntry1] : Ms. MANISH CHATTERJEE, 53 year old female, Current smoker (2 PPD x 35 years) , w/ hx of colon cancer, Emphysema, s/p Colectomy (2021), Left Pneumothorax (1998) family hx of lung cancer. Referred by Cancer care direct.   CT Chest on 8/2/2024: ([facility]) - Unchanged moderate-severe upper lobe predominant emphysema with bullous changes of both apices. - Increased solid and cystic nodule in the posterior RUL measuring 2.1 cm x 1.5 cm (series 6, image 98), previously 1.2 x 0.7 cm.  - Unchanged adjacent 0.7 cm (series 6, image 90) nodule.  - 0.6 cm anterior RUL nodule noted on prior study has essentially resolved with small residual linear opacity (series 6, image 89). Slightly decreased density of 0.7 cm RUL nodule  (series 6, image 182).  - New 0.6 cm RUL nodule on the same image.  - Newly enlarged right lower paratracheal lymph node measuring  3.7 x 2.2 x 7.6 cm (series 4, image 68). Other subcentimeter right paratracheal nodes have minimally increased. No obvious hilar lymph node enlargement on noncontrast CT.  Nondilated esophagus.   PET/CT on 8/8/24: ([facility]) - Confluent hypermetabolic adenopathy within the mediastinum involving the pretracheal, paratracheal, and precarinal regions.  - In the precarinal region this measures 3.9 x 1.7 cm with maximum SUV of 14.9.   - Hypermetabolic right hilar lymph node. - Fibrotic changes within the posterior right lung with hypermetabolic mixed cystic and solid nodule in this location measuring proximally 1.4 x 1.2 cm with maximum SUV of 3.9.  - Emphysematous changes bilaterally. Nodule within the anterior left upper lobe is too small to characterize on PET. - Mild hypermetabolic activity within the cervix. Correlation with gynecologic exam is suggested. - Partial visualization of hypermetabolic lesion within the right occipital lobe of the brain correlating to lesion recently noted on MRI.   I have independently reviewed the medical records and imaging at the time of this office consultation, and discussed the following interpretations with the patient: -

## 2025-04-11 ENCOUNTER — OUTPATIENT (OUTPATIENT)
Dept: INPATIENT UNIT | Facility: HOSPITAL | Age: 55
LOS: 1 days | Discharge: HOME | End: 2025-04-11
Payer: COMMERCIAL

## 2025-04-11 ENCOUNTER — TRANSCRIPTION ENCOUNTER (OUTPATIENT)
Age: 55
End: 2025-04-11

## 2025-04-11 VITALS
HEIGHT: 69 IN | OXYGEN SATURATION: 94 % | WEIGHT: 184.97 LBS | DIASTOLIC BLOOD PRESSURE: 79 MMHG | RESPIRATION RATE: 16 BRPM | TEMPERATURE: 98 F | HEART RATE: 95 BPM | SYSTOLIC BLOOD PRESSURE: 122 MMHG

## 2025-04-11 VITALS
RESPIRATION RATE: 14 BRPM | TEMPERATURE: 97 F | SYSTOLIC BLOOD PRESSURE: 122 MMHG | DIASTOLIC BLOOD PRESSURE: 70 MMHG | OXYGEN SATURATION: 94 % | HEART RATE: 74 BPM

## 2025-04-11 DIAGNOSIS — Z98.890 OTHER SPECIFIED POSTPROCEDURAL STATES: Chronic | ICD-10-CM

## 2025-04-11 DIAGNOSIS — Z98.51 TUBAL LIGATION STATUS: Chronic | ICD-10-CM

## 2025-04-11 DIAGNOSIS — Z45.2 ENCOUNTER FOR ADJUSTMENT AND MANAGEMENT OF VASCULAR ACCESS DEVICE: ICD-10-CM

## 2025-04-11 DIAGNOSIS — T45.1X5A ADVERSE EFFECT OF ANTINEOPLASTIC AND IMMUNOSUPPRESSIVE DRUGS, INITIAL ENCOUNTER: ICD-10-CM

## 2025-04-11 DIAGNOSIS — Z90.49 ACQUIRED ABSENCE OF OTHER SPECIFIED PARTS OF DIGESTIVE TRACT: Chronic | ICD-10-CM

## 2025-04-11 DIAGNOSIS — D64.81 ANEMIA DUE TO ANTINEOPLASTIC CHEMOTHERAPY: ICD-10-CM

## 2025-04-11 LAB
ANION GAP SERPL CALC-SCNC: 5 MMOL/L — SIGNIFICANT CHANGE UP (ref 5–17)
BUN SERPL-MCNC: 18 MG/DL — SIGNIFICANT CHANGE UP (ref 7–23)
CALCIUM SERPL-MCNC: 9.5 MG/DL — SIGNIFICANT CHANGE UP (ref 8.5–10.1)
CHLORIDE SERPL-SCNC: 111 MMOL/L — HIGH (ref 96–108)
CO2 SERPL-SCNC: 24 MMOL/L — SIGNIFICANT CHANGE UP (ref 22–31)
CREAT SERPL-MCNC: 1.02 MG/DL — SIGNIFICANT CHANGE UP (ref 0.5–1.3)
EGFR: 65 ML/MIN/1.73M2 — SIGNIFICANT CHANGE UP
EGFR: 65 ML/MIN/1.73M2 — SIGNIFICANT CHANGE UP
GLUCOSE SERPL-MCNC: 100 MG/DL — HIGH (ref 70–99)
HCT VFR BLD CALC: 34.5 % — SIGNIFICANT CHANGE UP (ref 34.5–45)
HGB BLD-MCNC: 11.4 G/DL — LOW (ref 11.5–15.5)
INR BLD: 1.02 RATIO — SIGNIFICANT CHANGE UP (ref 0.85–1.16)
MCHC RBC-ENTMCNC: 32.5 PG — SIGNIFICANT CHANGE UP (ref 27–34)
MCHC RBC-ENTMCNC: 33 G/DL — SIGNIFICANT CHANGE UP (ref 32–36)
MCV RBC AUTO: 98.3 FL — SIGNIFICANT CHANGE UP (ref 80–100)
NRBC # BLD AUTO: 0 K/UL — SIGNIFICANT CHANGE UP (ref 0–0)
NRBC # FLD: 0 K/UL — SIGNIFICANT CHANGE UP (ref 0–0)
NRBC BLD AUTO-RTO: 0 /100 WBCS — SIGNIFICANT CHANGE UP (ref 0–0)
PLATELET # BLD AUTO: 203 K/UL — SIGNIFICANT CHANGE UP (ref 150–400)
PMV BLD: 10.5 FL — SIGNIFICANT CHANGE UP (ref 7–13)
POTASSIUM SERPL-MCNC: 4.3 MMOL/L — SIGNIFICANT CHANGE UP (ref 3.5–5.3)
POTASSIUM SERPL-SCNC: 4.3 MMOL/L — SIGNIFICANT CHANGE UP (ref 3.5–5.3)
PROTHROM AB SERPL-ACNC: 12 SEC — SIGNIFICANT CHANGE UP (ref 9.9–13.4)
RBC # BLD: 3.51 M/UL — LOW (ref 3.8–5.2)
RBC # FLD: 15.1 % — HIGH (ref 10.3–14.5)
SODIUM SERPL-SCNC: 140 MMOL/L — SIGNIFICANT CHANGE UP (ref 135–145)
WBC # BLD: 6.62 K/UL — SIGNIFICANT CHANGE UP (ref 3.8–10.5)
WBC # FLD AUTO: 6.62 K/UL — SIGNIFICANT CHANGE UP (ref 3.8–10.5)

## 2025-04-11 PROCEDURE — 80048 BASIC METABOLIC PNL TOTAL CA: CPT

## 2025-04-11 PROCEDURE — 85027 COMPLETE CBC AUTOMATED: CPT

## 2025-04-11 PROCEDURE — 77001 FLUOROGUIDE FOR VEIN DEVICE: CPT | Mod: 26

## 2025-04-11 PROCEDURE — 93005 ELECTROCARDIOGRAM TRACING: CPT

## 2025-04-11 PROCEDURE — 76937 US GUIDE VASCULAR ACCESS: CPT | Mod: 26

## 2025-04-11 PROCEDURE — C1788: CPT

## 2025-04-11 PROCEDURE — C1894: CPT

## 2025-04-11 PROCEDURE — 93010 ELECTROCARDIOGRAM REPORT: CPT

## 2025-04-11 PROCEDURE — 36561 INSERT TUNNELED CV CATH: CPT | Mod: RT

## 2025-04-11 PROCEDURE — C1769: CPT

## 2025-04-11 PROCEDURE — 36561 INSERT TUNNELED CV CATH: CPT

## 2025-04-11 PROCEDURE — 36415 COLL VENOUS BLD VENIPUNCTURE: CPT

## 2025-04-11 PROCEDURE — 76937 US GUIDE VASCULAR ACCESS: CPT

## 2025-04-11 PROCEDURE — 85610 PROTHROMBIN TIME: CPT

## 2025-04-11 PROCEDURE — 77001 FLUOROGUIDE FOR VEIN DEVICE: CPT

## 2025-04-11 RX ORDER — FENTANYL CITRATE-0.9 % NACL/PF 100MCG/2ML
25 SYRINGE (ML) INTRAVENOUS
Refills: 0 | Status: DISCONTINUED | OUTPATIENT
Start: 2025-04-11 | End: 2025-04-11

## 2025-04-11 RX ORDER — OXYCODONE HYDROCHLORIDE 30 MG/1
5 TABLET ORAL ONCE
Refills: 0 | Status: DISCONTINUED | OUTPATIENT
Start: 2025-04-11 | End: 2025-04-11

## 2025-04-11 RX ORDER — SODIUM CHLORIDE 9 G/1000ML
1000 INJECTION, SOLUTION INTRAVENOUS
Refills: 0 | Status: DISCONTINUED | OUTPATIENT
Start: 2025-04-11 | End: 2025-04-11

## 2025-04-11 RX ORDER — ASPIRIN 325 MG
1 TABLET ORAL
Refills: 0 | DISCHARGE

## 2025-04-11 RX ORDER — FLUTICASONE FUROATE, UMECLIDINIUM BROMIDE AND VILANTEROL TRIFENATATE 100; 62.5; 25 UG/1; UG/1; UG/1
1 POWDER RESPIRATORY (INHALATION)
Refills: 0 | DISCHARGE

## 2025-04-11 RX ORDER — SERTRALINE 100 MG/1
1 TABLET, FILM COATED ORAL
Refills: 0 | DISCHARGE

## 2025-04-11 RX ORDER — ROSUVASTATIN CALCIUM 5 MG/1
1 TABLET, FILM COATED ORAL
Refills: 0 | DISCHARGE

## 2025-04-11 RX ORDER — PROPRANOLOL HCL 60 MG
1 TABLET ORAL
Refills: 0 | DISCHARGE

## 2025-04-11 RX ORDER — RIVAROXABAN 10 MG/1
1 TABLET, FILM COATED ORAL
Refills: 0 | DISCHARGE

## 2025-04-11 RX ORDER — HYDROMORPHONE/SOD CHLOR,ISO/PF 2 MG/10 ML
0.5 SYRINGE (ML) INJECTION
Refills: 0 | Status: DISCONTINUED | OUTPATIENT
Start: 2025-04-11 | End: 2025-04-11

## 2025-04-11 RX ORDER — FOLIC ACID 1 MG/1
1 TABLET ORAL
Refills: 0 | DISCHARGE

## 2025-04-11 NOTE — ASU PATIENT PROFILE, ADULT - NSICDXPASTMEDICALHX_GEN_ALL_CORE_FT
PAST MEDICAL HISTORY:  Brain aneurysm     Colon cancer     Emphysema of lung     GERD (gastroesophageal reflux disease)     Lung cancer mets to brain    Mitral valve prolapse     Pneumothorax, unspecified type     Seizures

## 2025-04-11 NOTE — ASU DISCHARGE PLAN (ADULT/PEDIATRIC) - FINANCIAL ASSISTANCE
[facility] provides services at a reduced cost to those who are determined to be eligible through [facility]’s financial assistance program. Information regarding [facility]’s financial assistance program can be found by going to [email or web address] or by calling [phone].

## 2025-04-11 NOTE — ASU PATIENT PROFILE, ADULT - FALL HARM RISK - UNIVERSAL INTERVENTIONS
Bed in lowest position, wheels locked, appropriate side rails in place/Call bell, personal items and telephone in reach/Instruct patient to call for assistance before getting out of bed or chair/Non-slip footwear when patient is out of bed/[place] to call system/Physically safe environment - no spills, clutter or unnecessary equipment/Purposeful Proactive Rounding/Room/bathroom lighting operational, light cord in reach

## 2025-04-11 NOTE — ASU PATIENT PROFILE, ADULT - NSICDXPASTSURGICALHX_GEN_ALL_CORE_FT
PAST SURGICAL HISTORY:  H/O craniotomy     H/O tubal ligation     History of bronchoscopy     History of colon resection 2001

## 2025-04-11 NOTE — ASU DISCHARGE PLAN (ADULT/PEDIATRIC) - NURSING INSTRUCTIONS
For any problems or concerns,contact your doctor. [facility] patients should call the [facility]. If you cannot reach the doctor or clinic, call [facility] Emergency Department at [phone] or go to your local Emergency Department.  A responsible adult should be with you for the rest of the day and night for your safety and to help you if you needed. Resume your medications as listed on the attached Medication Record. Begin with liquids and light food ( tea, toast, Jello, soups). Advance to what you normally eat. Liquids should taken in adequate amounts today.     CALL the DOCTOR:    -Fever greater than  101F  - Signs  of infection such as : increase pain,swelling,redness,or a bad  smell coming from the wound.  -Excessive amount of bleeding.  - Any pain that appears to be getting worse.  - Vomiting  -  If you have  not urinated 8 hours after surgery or have any difficulty urinating.     A responsible adult should be with you for the rest of the day and night for your safety and to help you if you needed.    Review attached FACT SHEET if applicable.